# Patient Record
Sex: FEMALE | Employment: FULL TIME | URBAN - METROPOLITAN AREA
[De-identification: names, ages, dates, MRNs, and addresses within clinical notes are randomized per-mention and may not be internally consistent; named-entity substitution may affect disease eponyms.]

---

## 2019-04-10 LAB
AVERAGE GLUCOSE: 117
CHOLESTEROL, TOTAL: 168 MG/DL
CHOLESTEROL/HDL RATIO: 3.4
CREATININE: 0.8 MG/DL
HBA1C MFR BLD: 5.7 %
HDLC SERPL-MCNC: 50 MG/DL (ref 35–70)
LDL CHOLESTEROL CALCULATED: 94 MG/DL (ref 0–160)
POTASSIUM (K+): 4.4
TRIGL SERPL-MCNC: 118 MG/DL
VLDLC SERPL CALC-MCNC: 24 MG/DL

## 2020-08-03 VITALS
RESPIRATION RATE: 14 BRPM | HEART RATE: 72 BPM | SYSTOLIC BLOOD PRESSURE: 124 MMHG | DIASTOLIC BLOOD PRESSURE: 82 MMHG | WEIGHT: 277 LBS

## 2020-08-03 RX ORDER — PRAVASTATIN SODIUM 10 MG
10 TABLET ORAL DAILY
COMMUNITY
End: 2021-11-15 | Stop reason: SDUPTHER

## 2020-08-03 RX ORDER — ASPIRIN 81 MG/1
81 TABLET ORAL DAILY
COMMUNITY

## 2020-08-03 RX ORDER — NADOLOL 40 MG/1
40 TABLET ORAL DAILY
COMMUNITY
End: 2021-05-28

## 2020-08-03 RX ORDER — LOSARTAN POTASSIUM AND HYDROCHLOROTHIAZIDE 12.5; 5 MG/1; MG/1
1 TABLET ORAL DAILY
COMMUNITY
End: 2021-11-15 | Stop reason: SDUPTHER

## 2020-08-03 RX ORDER — AMLODIPINE BESYLATE 10 MG/1
10 TABLET ORAL DAILY
COMMUNITY
End: 2021-11-15 | Stop reason: SDUPTHER

## 2020-08-03 SDOH — HEALTH STABILITY: MENTAL HEALTH: HOW OFTEN DO YOU HAVE A DRINK CONTAINING ALCOHOL?: NEVER

## 2020-10-08 VITALS
DIASTOLIC BLOOD PRESSURE: 84 MMHG | SYSTOLIC BLOOD PRESSURE: 122 MMHG | HEART RATE: 68 BPM | BODY MASS INDEX: 53.43 KG/M2 | TEMPERATURE: 96.8 F | WEIGHT: 283 LBS | HEIGHT: 61 IN | RESPIRATION RATE: 12 BRPM

## 2021-05-28 ENCOUNTER — HOSPITAL ENCOUNTER (EMERGENCY)
Age: 61
Discharge: HOME | End: 2021-05-28
Payer: COMMERCIAL

## 2021-05-28 VITALS
TEMPERATURE: 98.2 F | WEIGHT: 268 LBS | OXYGEN SATURATION: 97 % | RESPIRATION RATE: 16 BRPM | BODY MASS INDEX: 50.64 KG/M2 | DIASTOLIC BLOOD PRESSURE: 95 MMHG | SYSTOLIC BLOOD PRESSURE: 164 MMHG | HEART RATE: 66 BPM

## 2021-05-28 DIAGNOSIS — J01.90 ACUTE SINUSITIS, RECURRENCE NOT SPECIFIED, UNSPECIFIED LOCATION: Primary | ICD-10-CM

## 2021-05-28 DIAGNOSIS — J04.0 LARYNGITIS: ICD-10-CM

## 2021-05-28 PROCEDURE — 99211 OFF/OP EST MAY X REQ PHY/QHP: CPT

## 2021-05-28 RX ORDER — AMOXICILLIN AND CLAVULANATE POTASSIUM 875; 125 MG/1; MG/1
1 TABLET, FILM COATED ORAL 2 TIMES DAILY
Qty: 14 TABLET | Refills: 0 | Status: SHIPPED | OUTPATIENT
Start: 2021-05-28 | End: 2021-06-04

## 2021-05-28 RX ORDER — PREDNISONE 20 MG/1
40 TABLET ORAL DAILY
Qty: 10 TABLET | Refills: 0 | Status: SHIPPED | OUTPATIENT
Start: 2021-05-28 | End: 2021-06-02

## 2021-05-28 NOTE — ED PROVIDER NOTES
Department of Emergency . [facility]  Provider Note  Admit Date/RoomTime: 2021  8:34 AM  Room:   NAME: Tabby Ayon  : 1960  MRN: [id number]     Chief Complaint:  Laryngitis (head congestion, started about 3 days ago, started like allergies) and Letter for School/Work    History of Present Illness       Tabby Ayon is a 59 y.o. old female who presents to the emergency department evaluation of sinus congestion ear pressure and hoarse voice. She said it has been going on for 3 days. She denies any chest pain chest congestion fever shortness of breath or cough. ROS    Pertinent positives and negatives are stated within HPI, all other systems reviewed and are negative. Past Surgical History:   Procedure Laterality Date    CARDIAC CATHETERIZATION      COLONOSCOPY     Social History:  reports that she has never smoked. She does not have any smokeless tobacco history on file. She reports that she does not drink alcohol and does not use drugs. Family History: family history includes Cancer in her brother; Kidney Cancer in her mother; No Known Problems in her father; Other in her mother. Allergies: Tetanus toxoids and Codeine    Physical Exam            ED Triage Vitals [21 0839]   BP Temp Temp src Pulse Resp SpO2 Height Weight   (!) 164/95 98.2 °F (36.8 °C) -- 66 16 97 % -- 268 lb (121.6 kg)      Oxygen Saturation Interpretation: Normal.    Constitutional:  Alert, development consistent with age. Hoarse voice  Ears:  External Ears: Bilateral normal.               TM's & External Canals: normal appearance, normal TMs bilaterally. Nose:   There is no discharge, swelling or lesions noted. Sinuses: mild Bilateral maxillary sinus tenderness. mild Bilateral frontal sinus tenderness. Mouth:  normal tongue and buccal mucosa. Throat: no erythema or exudates noted. Teeth and gums normal..  Airway Patent.   Neck/Lymphatics: Neck Supple. CV:  Regular rate and rhythm, normal heart sounds, without pathological murmurs, ectopy, gallops, or rubs. GI:  Abdomen Soft, nontender, good bowel sounds. No firm or pulsatile mass. Integument:  Normal turgor. Warm, dry, without visible rash. Neurological:  Oriented. Motor functions intact. Lab / Imaging Results   (All laboratory and radiology results have been personally reviewed by myself)  Labs:  No results found for this visit on 05/28/21. Imaging: All Radiology results interpreted by Radiologist unless otherwise noted. No orders to display     ED Course / Medical Decision Making   Medications - No data to display       MDM:    Patient here for evaluation. She has a hoarse voice and sinus pain and pressure I did start her on Augmentin and prednisone advised over-the-counter cold medicine as needed and follow-up with her PCP      Plan of Care: Normal progression of disease discussed. All questions answered. Explained the rationale for symptomatic treatmen  Instruction provided in the use of fluids, vaporizer, acetaminophen, and other OTC medication for symptom control. Extra fluids  Analgesics as needed, dose reviewed. Follow up as needed should symptoms fail to improve. Assessment      1. Acute sinusitis, recurrence not specified, unspecified location    2. Laryngitis      Plan   Discharge to home and advised to [facility], [address] [facility]  [phone]      As needed   Patient condition is good    New Medications     New Prescriptions    AMOXICILLIN-CLAVULANATE (AUGMENTIN) 875-125 MG PER TABLET    Take 1 tablet by mouth 2 times daily for 7 days    PREDNISONE (DELTASONE) 20 MG TABLET    Take 2 tablets by mouth daily for 5 days     Electronically signed by AYDIN Rowland CNP   DD: 5/28/21  **This report was transcribed using voice recognition software.  Every effort was made to ensure accuracy; however, inadvertent computerized transcription errors may be present.   END OF ED PROVIDER NOTE     Lila Loredo, AYDIN - MICHELLE  05/28/21 2277

## 2021-11-15 NOTE — TELEPHONE ENCOUNTER
----- Message from Liliana Francois sent at 11/15/2021  3:19 PM EST -----  Subject: Refill Request    QUESTIONS  Name of Medication? amLODIPine (NORVASC) 10 MG tablet  Patient-reported dosage and instructions? 10MG, once a day  How many days do you have left? 7  Preferred Pharmacy? [facility] phone number (if available)? [phone]  ---------------------------------------------------------------------------  --------------,  Name of Medication? pravastatin (PRAVACHOL) 10 MG tablet  Patient-reported dosage and instructions? 10MG, once a daily  How many days do you have left? 7  Preferred Pharmacy? [facility] phone number (if available)? [phone]  ---------------------------------------------------------------------------  --------------,  Name of Medication? losartan-hydroCHLOROthiazide (HYZAAR) 50-12.5 MG per   tablet  Patient-reported dosage and instructions? 50-12.5 MG, once daily  How many days do you have left? 7  Preferred Pharmacy? [facility] phone number (if available)? [phone]  ---------------------------------------------------------------------------  --------------  CALL BACK INFO  What is the best way for the office to contact you? OK to leave message on   voicemail  Preferred Call Back Phone Number?  [phone]

## 2021-11-16 RX ORDER — AMLODIPINE BESYLATE 10 MG/1
10 TABLET ORAL DAILY
Qty: 90 TABLET | Refills: 0 | Status: SHIPPED
Start: 2021-11-16 | End: 2021-11-19

## 2021-11-16 RX ORDER — PRAVASTATIN SODIUM 10 MG
10 TABLET ORAL DAILY
Qty: 90 TABLET | Refills: 0 | Status: SHIPPED
Start: 2021-11-16 | End: 2021-11-19

## 2021-11-16 RX ORDER — LOSARTAN POTASSIUM AND HYDROCHLOROTHIAZIDE 12.5; 5 MG/1; MG/1
1 TABLET ORAL DAILY
Qty: 90 TABLET | Refills: 0 | Status: SHIPPED | OUTPATIENT
Start: 2021-11-16

## 2021-11-19 ENCOUNTER — TELEPHONE (OUTPATIENT)
Dept: FAMILY MEDICINE CLINIC | Age: 61
End: 2021-11-19

## 2021-11-19 RX ORDER — NADOLOL 40 MG/1
40 TABLET ORAL DAILY
Qty: 90 TABLET | Refills: 1 | Status: SHIPPED | OUTPATIENT
Start: 2021-11-19

## 2021-11-19 RX ORDER — PRAVASTATIN SODIUM 20 MG
20 TABLET ORAL DAILY
Qty: 90 TABLET | Refills: 0 | Status: SHIPPED | OUTPATIENT
Start: 2021-11-19

## 2021-11-19 RX ORDER — PRAVASTATIN SODIUM 20 MG
20 TABLET ORAL DAILY
COMMUNITY
End: 2021-11-19 | Stop reason: SDUPTHER

## 2021-11-19 RX ORDER — NADOLOL 40 MG/1
40 TABLET ORAL DAILY
COMMUNITY

## 2021-11-19 NOTE — TELEPHONE ENCOUNTER
----- Message from Martin Vera sent at 11/18/2021  5:53 PM EST -----  Subject: Medication Problem    QUESTIONS  Name of Medication? amLODIPine (NORVASC) 10 MG tablet  Patient-reported dosage and instructions? none  What question or problem do you have with the medication? Pt got the wrong   prescription for amLODIpine (Norvasc) 10 MG tablet and she was supposed to   get Nadolol 40 MG. This one is not in her medication chart. She needs the   Nadolol prescription sent in   Preferred Pharmacy? [facility] [address] [facility], [address] [facility]  Pharmacy phone number (if available)? [phone]  Additional Information for Provider?   ---------------------------------------------------------------------------  --------------  [address]  What is the best way for the office to contact you? OK to leave message on   voicemail  Preferred Call Back Phone Number? [phone]  ---------------------------------------------------------------------------  --------------  SCRIPT ANSWERS  Relationship to Patient?  Self

## 2021-11-19 NOTE — TELEPHONE ENCOUNTER
----- Message from Adrian Jamison sent at 11/18/2021  5:50 PM EST -----  Subject: Medication Problem    QUESTIONS  Name of Medication? pravastatin (PRAVACHOL) 10 MG tablet  Patient-reported dosage and instructions? 1 tablet by mouth a day. What question or problem do you have with the medication? pt was saying   she is supposed to be taking 1- 20 MG tablet a day but only got prescribed   for the 10MG once a day. Preferred Pharmacy? [facility] [address], [address] [facility]  Pharmacy phone number (if available)? [phone]  Additional Information for Provider?   ---------------------------------------------------------------------------  --------------  [address]  What is the best way for the office to contact you? OK to leave message on   voicemail  Preferred Call Back Phone Number? [phone]  ---------------------------------------------------------------------------  --------------  SCRIPT ANSWERS  Relationship to Patient?  Self

## 2022-09-05 ENCOUNTER — HOSPITAL ENCOUNTER (EMERGENCY)
Age: 62
Discharge: HOME | End: 2022-09-05
Payer: COMMERCIAL

## 2022-09-05 VITALS
TEMPERATURE: 98.3 F | RESPIRATION RATE: 20 BRPM | OXYGEN SATURATION: 99 % | HEART RATE: 74 BPM | BODY MASS INDEX: 53.66 KG/M2 | WEIGHT: 284 LBS | DIASTOLIC BLOOD PRESSURE: 91 MMHG | SYSTOLIC BLOOD PRESSURE: 155 MMHG

## 2022-09-05 DIAGNOSIS — Z51.89 VISIT FOR WOUND CHECK: Primary | ICD-10-CM

## 2022-09-05 PROCEDURE — 99211 OFF/OP EST MAY X REQ PHY/QHP: CPT

## 2022-09-05 RX ORDER — CEPHALEXIN 500 MG/1
500 CAPSULE ORAL 3 TIMES DAILY
Qty: 21 CAPSULE | Refills: 0 | Status: SHIPPED | OUTPATIENT
Start: 2022-09-05 | End: 2022-09-12

## 2022-09-05 ASSESSMENT — PAIN - FUNCTIONAL ASSESSMENT: PAIN_FUNCTIONAL_ASSESSMENT: NONE - DENIES PAIN

## 2022-09-05 NOTE — ED PROVIDER NOTES
[address] [facility]  Department of Emergency Medicine  UC Encounter Note  22   10:01 AM EDT      NAME: Calvin Paredes  :  1960  MRN:  [id number]    Chief Complaint: Urticaria (Pt has random hives on arms and legs for a couple weeks. Pt has one that caused a blister to left knee. Pt concerned)      This is a 52-year-old female the presents urgent care complaining of these little small round itchy sores on her legs for the last couple weeks. This been no chest pain or shortness of breath. She denies any edema of her legs or weeping of her skin in her legs. However she has noticed that with these little skin lesions they do fill up with a little bit of fluid in the 1 on the left shin has a blister at this time. She denies any fevers or chills. On first contact patient she appears to be no acute distress. Review of Systems  Pertinent positives and negatives are stated within HPI, all other systems reviewed and are negative. Physical Exam  Vitals and nursing note reviewed. Constitutional:       Appearance: She is well-developed. HENT:      Head: Normocephalic and atraumatic. Right Ear: Hearing and external ear normal.      Left Ear: Hearing and external ear normal.      Nose: Nose normal.      Mouth/Throat:      Pharynx: Uvula midline. Eyes:      General: Lids are normal.      Conjunctiva/sclera: Conjunctivae normal.      Pupils: Pupils are equal, round, and reactive to light. Cardiovascular:      Rate and Rhythm: Normal rate and regular rhythm. Heart sounds: Normal heart sounds. No murmur heard. Pulmonary:      Effort: Pulmonary effort is normal.      Breath sounds: Normal breath sounds. Abdominal:      General: Bowel sounds are normal.      Palpations: Abdomen is soft. Abdomen is not rigid. Tenderness: There is no abdominal tenderness. There is no guarding or rebound. Musculoskeletal:      Cervical back: Normal range of motion and neck supple. Skin:     General: Skin is warm and dry. Findings: Wound present. No abrasion. Comments: She has healed skin lesions to her right shin area they are very small. She also has a blister to her left shin which is about 2 cm x 2 cm looks like it has serous fluid and there and I was able to clean the site with Betadine and I took a 22-gauge needle and opened that up and serous fluid came out. There is no swelling to the leg. No surrounding redness. Neurological:      General: No focal deficit present. Mental Status: She is alert and oriented to person, place, and time. GCS: GCS eye subscore is 4. GCS verbal subscore is 5. GCS motor subscore is 6. Cranial Nerves: No cranial nerve deficit. Sensory: No sensory deficit. Coordination: Coordination normal.      Gait: Gait normal.       Procedures    MDM  Number of Diagnoses or Management Options  Visit for wound check  Diagnosis management comments: I told the patient to keep the areas clean. Wound care was discussed. She is in no acute distress. I will place her on an oral antibiotic she may use topical antibiotics as well. Instructions given. Told return if symptoms worsen. --------------------------------------------- PAST HISTORY ---------------------------------------------  Past Medical History:  has a past medical history of CAD (coronary artery disease), Depression, DJD (degenerative joint disease), Hypercalcemia, Hypercalcemia, Hyperlipidemia, Hypertension, Obesity, and Persistent insomnia. Past Surgical History:  has a past surgical history that includes Cardiac catheterization (2004) and Colonoscopy (2008). Social History:  reports that she does not drink alcohol and does not use drugs. Family History: family history includes Cancer in her brother; Kidney Cancer in her mother; No Known Problems in her father; Other in her mother. The patients home medications have been reviewed.     Allergies:

## 2022-09-05 NOTE — DISCHARGE INSTRUCTIONS
Take antihistamine for itching as direct  Keep skin clean  May use antibiotic ointment daily with bandage

## 2022-09-18 ENCOUNTER — HOSPITAL ENCOUNTER (EMERGENCY)
Age: 62
Discharge: HOME | End: 2022-09-18
Payer: COMMERCIAL

## 2022-09-18 VITALS
HEART RATE: 65 BPM | DIASTOLIC BLOOD PRESSURE: 99 MMHG | WEIGHT: 287 LBS | OXYGEN SATURATION: 99 % | HEIGHT: 62 IN | RESPIRATION RATE: 20 BRPM | SYSTOLIC BLOOD PRESSURE: 149 MMHG | TEMPERATURE: 99.6 F | BODY MASS INDEX: 52.81 KG/M2

## 2022-09-18 DIAGNOSIS — L03.116 CELLULITIS OF LEFT LOWER EXTREMITY: Primary | ICD-10-CM

## 2022-09-18 PROCEDURE — 99211 OFF/OP EST MAY X REQ PHY/QHP: CPT

## 2022-09-18 RX ORDER — DOXYCYCLINE HYCLATE 100 MG
100 TABLET ORAL 2 TIMES DAILY
Qty: 20 TABLET | Refills: 0 | Status: SHIPPED | OUTPATIENT
Start: 2022-09-18 | End: 2022-09-28

## 2022-09-18 RX ORDER — PREDNISONE 10 MG/1
40 TABLET ORAL DAILY
Qty: 20 TABLET | Refills: 0 | Status: SHIPPED | OUTPATIENT
Start: 2022-09-18 | End: 2022-09-23

## 2022-09-18 ASSESSMENT — PAIN - FUNCTIONAL ASSESSMENT: PAIN_FUNCTIONAL_ASSESSMENT: NONE - DENIES PAIN

## 2022-09-18 NOTE — ED PROVIDER NOTES
Department of Emergency [address] [facility]  Provider Note  Admit Date/Time: 9/18/2022  9:46 AM  Room: 07/07  MRN: [id number]  Chief Complaint: Rash (Started with rash yesterday left leg    had  come in on 9/5 had area  on left leg was given antibiotic)       History of Present Illness   Source of history provided by:  Patient. History/Exam Limitations: None. Laxmi Reddy is a 57 y.o. female with a history of CAD. She was seen here approximately 2 weeks ago for similar rash on the anterior aspect the left lower extremity that resolved with Keflex. Was doing well until yesterday when she developed a similar lesion on the left posterior calf. It is pruritic. Not tender. No envenomations that she is aware of. No rash elsewhere. Denies any discharge or drainage. Denies any fevers or chills. Denies any nausea or vomiting. Is not diabetic. ROS    Pertinent positives and negatives are stated within HPI, all other systems reviewed and are negative. Past Surgical History:   Procedure Laterality Date    CARDIAC CATHETERIZATION  2004    COLONOSCOPY  2008   Social History:  reports that she does not drink alcohol and does not use drugs. Family History: family history includes Cancer in her brother; Kidney Cancer in her mother; No Known Problems in her father; Other in her mother. Allergies: Tetanus toxoids and Codeine    Physical Exam   Oxygen Saturation Interpretation: Normal.   ED Triage Vitals [09/18/22 0943]   BP Temp Temp Source Heart Rate Resp SpO2 Height Weight   (!) 149/99 99.6 °F (37.6 °C) Infrared 65 20 99 % 5' 2\" (1.575 m) 287 lb (130.2 kg)       Physical Exam  General: Vitals noted, no distress. Afebrile. EENT: Posterior oropharynx unremarkable. Cardiac: Regular, rate, rhythm, no murmur. Pulmonary: Lungs clear bilaterally with good aeration. No adventitious breath sounds. Abdomen: Soft, nonsurgical. Nontender. No peritoneal signs.  Normoactive bowel sounds. Extremities: No peripheral edema. Negative Homans bilaterally, no cords. Neurovascularly intact throughout. Skin: Exam of the left calf shows a less than 1 cm² nontense bulla with surrounding mild erythema and warmth indicative of cellulitis versus possible localized allergic reaction. No fluctuance or drainable fluid collection. No lymphangitic streaking. No ecchymosis, bullae, or crepitance to suggest necrotizing fasciitis. Neuro: No gross neurologic deficits. Lab / Imaging Results   (All laboratory and radiology results have been personally reviewed by myself)  Labs:  No results found for this visit on 09/18/22. Imaging: All Radiology results interpreted by Radiologist unless otherwise noted. No orders to display       ED Course / Medical Decision Making   Medications - No data to display       Consult(s):   None    Procedure(s):   None    Differential Diagnosis: Is extensive but includes cellulitis, lymphangitis, cutaneous abscess, retained foreign body, myositis, osteomyelitis, etc.    MDM:   This is a 57 y.o. female who presents with the above history and exam findings. The bulla is nontense and was not drained. Discussed this will drain naturally over the next several days. Suspect likely mild cellulitis versus a localized allergic reaction. Will be home-going with a burst of prednisone as well as doxycycline. Counseling: I discussed the differential, results and discharge plan with the patient and/or family/friend/caregiver if present. I emphasized the importance of follow-up with the physician I referred them to in the timeframe recommended. I explained reasons for the patient to return to the Emergency Department. Additional verbal discharge instructions were also given and discussed with the patient to supplement those generated by the EMR. We also discussed medications that were prescribed (if any) including common side effects and interactions.  The patient was advised to

## 2023-05-16 ENCOUNTER — HOSPITAL ENCOUNTER (EMERGENCY)
Age: 63
Discharge: HOME | End: 2023-05-16
Payer: COMMERCIAL

## 2023-05-16 VITALS
SYSTOLIC BLOOD PRESSURE: 134 MMHG | OXYGEN SATURATION: 98 % | HEART RATE: 79 BPM | DIASTOLIC BLOOD PRESSURE: 75 MMHG | TEMPERATURE: 97.9 F | RESPIRATION RATE: 18 BRPM | BODY MASS INDEX: 49.13 KG/M2 | HEIGHT: 62 IN | WEIGHT: 267 LBS

## 2023-05-16 DIAGNOSIS — J01.90 ACUTE NON-RECURRENT SINUSITIS, UNSPECIFIED LOCATION: Primary | ICD-10-CM

## 2023-05-16 PROCEDURE — 99211 OFF/OP EST MAY X REQ PHY/QHP: CPT

## 2023-05-16 RX ORDER — AMOXICILLIN 875 MG/1
875 TABLET, COATED ORAL 2 TIMES DAILY
Qty: 20 TABLET | Refills: 0 | Status: SHIPPED | OUTPATIENT
Start: 2023-05-16 | End: 2023-05-26

## 2023-05-16 RX ORDER — PREDNISONE 10 MG/1
TABLET ORAL
Qty: 12 TABLET | Refills: 0 | Status: SHIPPED | OUTPATIENT
Start: 2023-05-16

## 2023-05-16 ASSESSMENT — PAIN - FUNCTIONAL ASSESSMENT: PAIN_FUNCTIONAL_ASSESSMENT: NONE - DENIES PAIN

## 2023-05-16 NOTE — ED PROVIDER NOTES
Skin:     General: Skin is warm and dry. Findings: No rash. Neurological:      General: No focal deficit present. Mental Status: She is alert and oriented to person, place, and time. Psychiatric:         Behavior: Behavior is cooperative.       -------------------------------------------------- RESULTS -------------------------------------------------  No results found for this visit on 05/16/23. No orders to display       Labs Reviewed - No data to display    When ordered only abnormal lab results are displayed. All other labs were within normal range or not returned as of this dictation. Interpretation per the Radiologist below, if available at the time of this note:    No orders to display     No results found. No results found.     -------------------------------------------------PROCEDURES--------------------------------------------  Unless otherwise noted below, none      Procedures      ---EMERGENCY DEPARTMENT COURSE and DIFFERENTIAL DIAGNOSIS/MDM---  (CC/HPI Summary, DDx, ED Course, and Reassessment:) (Disposition Considerations (include 1 Tests not done, Admit vs D/C, Shared Decision Making, Pt Expectation of Test or Tx., Consults, Social Determinants, Chronic Conditiions, Records reviewed)    MDM  Number of Diagnoses or Management Options  Acute non-recurrent sinusitis, unspecified location  Diagnosis management comments: This is a 59-year-old female in no acute distress. She does have sinus URI symptoms. I did talk to her about treatment. We will place her on a respiratory antibiotic amoxicillin will place on a steroid taper to help with the pain and pressure I did tell her to take some plain Robitussin to help with the congestion and also to take her Zyrtec for runny nose and postnasal drip. Instructions given. Told return if symptoms do not improve.          DISCHARGE MEDICATIONS:  New Prescriptions    No medications on file       DISCONTINUED MEDICATIONS:  Discontinued

## 2023-09-04 ENCOUNTER — HOSPITAL ENCOUNTER (EMERGENCY)
Age: 63
Discharge: HOME | End: 2023-09-04
Payer: COMMERCIAL

## 2023-09-04 VITALS
HEIGHT: 62 IN | SYSTOLIC BLOOD PRESSURE: 137 MMHG | OXYGEN SATURATION: 100 % | RESPIRATION RATE: 18 BRPM | TEMPERATURE: 98.5 F | HEART RATE: 57 BPM | WEIGHT: 267 LBS | BODY MASS INDEX: 49.13 KG/M2 | DIASTOLIC BLOOD PRESSURE: 91 MMHG
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
## 2023-09-04 DIAGNOSIS — S80.862A INSECT BITE OF LEFT LOWER LEG, INITIAL ENCOUNTER: Primary | ICD-10-CM

## 2023-09-04 PROCEDURE — 99211 OFF/OP EST MAY X REQ PHY/QHP: CPT

## 2023-09-04 RX ORDER — PREDNISONE 10 MG/1
TABLET ORAL
Qty: 12 TABLET | Refills: 0 | Status: SHIPPED | OUTPATIENT
Start: 2023-09-04

## 2023-09-04 RX ORDER — DOXYCYCLINE HYCLATE 100 MG
100 TABLET ORAL 2 TIMES DAILY
Qty: 14 TABLET | Refills: 0 | Status: SHIPPED | OUTPATIENT
Start: 2023-09-04 | End: 2023-09-11

## 2023-09-04 ASSESSMENT — PAIN DESCRIPTION - LOCATION: LOCATION: LEG

## 2023-09-04 ASSESSMENT — PAIN DESCRIPTION - ONSET: ONSET: GRADUAL

## 2023-09-04 ASSESSMENT — PAIN - FUNCTIONAL ASSESSMENT: PAIN_FUNCTIONAL_ASSESSMENT: 0-10

## 2023-09-04 ASSESSMENT — PAIN SCALES - GENERAL: PAINLEVEL_OUTOF10: 3

## 2023-09-04 ASSESSMENT — PAIN DESCRIPTION - PAIN TYPE: TYPE: ACUTE PAIN

## 2023-09-04 ASSESSMENT — PAIN DESCRIPTION - ORIENTATION: ORIENTATION: LEFT;LOWER

## 2023-09-04 ASSESSMENT — PAIN DESCRIPTION - FREQUENCY: FREQUENCY: CONTINUOUS

## 2025-02-18 ENCOUNTER — HOSPITAL ENCOUNTER (EMERGENCY)
Age: 65
Discharge: HOME | End: 2025-02-18
Payer: COMMERCIAL

## 2025-02-18 VITALS
TEMPERATURE: 97.9 F | RESPIRATION RATE: 20 BRPM | DIASTOLIC BLOOD PRESSURE: 84 MMHG | SYSTOLIC BLOOD PRESSURE: 147 MMHG | WEIGHT: 289 LBS | BODY MASS INDEX: 52.86 KG/M2 | OXYGEN SATURATION: 98 % | HEART RATE: 59 BPM

## 2025-02-18 DIAGNOSIS — R05.1 ACUTE COUGH: Primary | ICD-10-CM

## 2025-02-18 LAB
FLUAV RNA RESP QL NAA+PROBE: NOT DETECTED
FLUBV RNA RESP QL NAA+PROBE: NOT DETECTED
SARS-COV-2 RNA RESP QL NAA+PROBE: NOT DETECTED
SOURCE: NORMAL
SPECIMEN DESCRIPTION: NORMAL

## 2025-02-18 PROCEDURE — 99211 OFF/OP EST MAY X REQ PHY/QHP: CPT

## 2025-02-18 PROCEDURE — 87636 SARSCOV2 & INF A&B AMP PRB: CPT

## 2025-02-18 RX ORDER — FLUTICASONE PROPIONATE 50 MCG
2 SPRAY, SUSPENSION (ML) NASAL DAILY
Qty: 16 G | Refills: 0 | Status: SHIPPED | OUTPATIENT
Start: 2025-02-18

## 2025-02-18 RX ORDER — GUAIFENESIN 1200 MG/1
1 TABLET, EXTENDED RELEASE ORAL EVERY 12 HOURS PRN
Qty: 10 TABLET | Refills: 0 | Status: SHIPPED | OUTPATIENT
Start: 2025-02-18 | End: 2025-02-23

## 2025-02-18 ASSESSMENT — PAIN - FUNCTIONAL ASSESSMENT: PAIN_FUNCTIONAL_ASSESSMENT: 0-10

## 2025-02-18 ASSESSMENT — PAIN SCALES - GENERAL: PAINLEVEL_OUTOF10: 0

## 2025-02-18 NOTE — ED PROVIDER NOTES
Department of Emergency Medicine   ED  Encounter Note  Admit Date/RoomTime: 2025  9:09 AM  ED Room:     NAME: Vanessa Roth  : 1960  MRN: [id number]     Chief Complaint:  Cough (Coughing, congestion, nasal drainage, mucous since last Monday. )    History of Present Illness       Vanessa Roth is a 64 y.o. old female who presents to urgent care by private vehicle, for nasal congestion and cough, which began 6 day(s) prior to arrival.  Since onset the symptoms have been stable and mild-moderate in severity. The symptoms are associated with chest congestion.  There has been no chest pain, dyspnea, hemoptysis, vomiting or diarrhea.  ROS   Pertinent positives and negatives are stated within HPI, all other systems reviewed and are negative.    Past Medical History:  has a past medical history of CAD (coronary artery disease), Depression, DJD (degenerative joint disease), Hypercalcemia, Hypercalcemia, Hyperlipidemia, Hypertension, Obesity, and Persistent insomnia.    Surgical History:  has a past surgical history that includes Cardiac catheterization () and Colonoscopy ().    Social History:  reports that she has never smoked. She has never used smokeless tobacco. She reports that she does not drink alcohol and does not use drugs.    Family History: family history includes Cancer in her brother; Kidney Cancer in her mother; No Known Problems in her father; Other in her mother.     Allergies: Tetanus toxoids, Tylenol with codeine #3 [acetaminophen-codeine], and Codeine    Physical Exam   Oxygen Saturation Interpretation: Normal on room air analysis.        ED Triage Vitals   BP Systolic BP Percentile Diastolic BP Percentile Temp Temp src Pulse Respirations SpO2   25 0911 -- -- 25 0911 -- 25 0911 25 0911 25 1014   (!) 147/84   97.9 °F (36.6 °C)  59 20 98 %      Height Weight - Scale         -- 25 0910          131.1 kg (289 lb)               Constitutional: